# Patient Record
Sex: FEMALE | Race: WHITE | NOT HISPANIC OR LATINO | URBAN - NONMETROPOLITAN AREA
[De-identification: names, ages, dates, MRNs, and addresses within clinical notes are randomized per-mention and may not be internally consistent; named-entity substitution may affect disease eponyms.]

---

## 2024-10-28 ENCOUNTER — OFFICE VISIT (OUTPATIENT)
Dept: FAMILY MEDICINE CLINIC | Age: 19
End: 2024-10-28
Payer: COMMERCIAL

## 2024-10-28 VITALS
SYSTOLIC BLOOD PRESSURE: 115 MMHG | DIASTOLIC BLOOD PRESSURE: 64 MMHG | OXYGEN SATURATION: 96 % | HEART RATE: 98 BPM | WEIGHT: 150.4 LBS | HEIGHT: 66 IN | TEMPERATURE: 97.9 F | BODY MASS INDEX: 24.17 KG/M2

## 2024-10-28 DIAGNOSIS — L23.7 ALLERGIC DERMATITIS DUE TO POISON SUMAC: Primary | ICD-10-CM

## 2024-10-28 RX ORDER — PROMETHAZINE HYDROCHLORIDE 12.5 MG/1
12.5 TABLET ORAL EVERY 6 HOURS PRN
COMMUNITY
Start: 2024-07-25

## 2024-10-28 RX ORDER — DIAPER,BRIEF,INFANT-TODD,DISP
1 EACH MISCELLANEOUS 2 TIMES DAILY
Qty: 28 G | Refills: 0 | Status: SHIPPED | OUTPATIENT
Start: 2024-10-28

## 2024-10-28 RX ORDER — ONDANSETRON 4 MG/1
4 TABLET, FILM COATED ORAL EVERY 8 HOURS PRN
COMMUNITY
Start: 2024-09-11

## 2024-10-28 NOTE — LETTER
October 28, 2024     Patient: Cathleen Grossman   YOB: 2005   Date of Visit: 10/28/2024       To Whom It May Concern:    It is my medical opinion that Cathleen Grossman may return to work on Saturday November 2nd.       Sincerely,        Sean Velásquez MD    CC: No Recipients

## 2024-10-28 NOTE — PROGRESS NOTES
Chief Complaint  Rash    Subjective      Cathleen Grossman is a 19 y.o. female who presents to [facility]     Patient Care Team:  Sean Velásquez MD as PCP - General (Internal Medicine)  Ms. Grossman presents to the office today to establish care and for evaluation of a rash.  She was out helping her grandmother cut down weeds and has a rash from either poison ivy or sumac.  She is currently 21 weeks pregnant.  She has been trying calamine lotion with little improvement.  Objective   Vital Signs:   There were no vitals filed for this visit.  There is no height or weight on file to calculate BMI.    Wt Readings from Last 3 Encounters:   No data found for Wt     BP Readings from Last 3 Encounters:   No data found for BP       Health Maintenance   Topic Date Due    HPV VACCINES (1 - 3-dose series) Never done    TDAP/TD VACCINES (1 - Tdap) Never done    INFLUENZA VACCINE  Never done    COVID-19 Vaccine (1 - 2023-24 season) Never done    HEPATITIS C SCREENING  Never done    ANNUAL PHYSICAL  Never done    Pneumococcal Vaccine 0-64  Aged Out    MENINGOCOCCAL VACCINE  Aged Out       Physical Exam  Skin:            GEN:NAD, well nourished  HEENT:NCAT, PERRL, EOMI, OP clear, MMM  NECK:supple, no JVD, no carotid bruits  CVA:RRR s1, s2 no m/r/g  CHEST:CTA B no wheezes or rhonchi  ABD:soft, gravid uterus  EXT:no c/c/e 2+PP B  NEURO:CN II-XII grossly nonfocal, no evidence of asterixes or tremor  PSYCH: appropriate mood and affect  :deferred  SKIN: warm, dry, intact, rsh, see above  Result Review   The following data was reviewed by: Sean Velásquez MD on 10/28/2024:  [x]  Tests & Results  []  Hospitalization/Emergency Department/Urgent Care  []  Internal/External Consultant Notes    Procedures          ASSESSMENT/PLAN  Diagnoses and all orders for this visit:    1. Allergic dermatitis due to poison sumac (Primary)  Comments:  topical hydrocortisone cream to affected area twice daily.    Other  orders  -     hydrocortisone 1 % ointment; Apply 1 Application topically to the appropriate area as directed 2 (Two) Times a Day.  Dispense: 28 g; Refill: 0        BMI cannot be calculated due to outdated height or weight values.  Please input a current height/weight in Vitals and re-renter [id number] in Note to pull in correct documentation based on BMI range.              FOLLOW UP  Return in about 6 months (around 4/28/2025).  Patient was given instructions and counseling regarding her condition or for health maintenance advice. Please see specific information pulled into the AVS if appropriate.       Sean Velásquez MD  10/28/24  11:07 EDT